# Patient Record
Sex: MALE | Race: WHITE
[De-identification: names, ages, dates, MRNs, and addresses within clinical notes are randomized per-mention and may not be internally consistent; named-entity substitution may affect disease eponyms.]

---

## 2020-10-23 ENCOUNTER — HOSPITAL ENCOUNTER (EMERGENCY)
Dept: HOSPITAL 63 - ER | Age: 85
LOS: 1 days | Discharge: ANOTHER HOSPITAL | End: 2020-10-24
Payer: MEDICARE

## 2020-10-23 VITALS — BODY MASS INDEX: 30.07 KG/M2 | WEIGHT: 198.42 LBS | HEIGHT: 68 IN

## 2020-10-23 DIAGNOSIS — J96.01: Primary | ICD-10-CM

## 2020-10-23 DIAGNOSIS — K21.9: ICD-10-CM

## 2020-10-23 DIAGNOSIS — G93.40: ICD-10-CM

## 2020-10-23 DIAGNOSIS — R53.83: ICD-10-CM
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
## 2020-10-23 DIAGNOSIS — I10: ICD-10-CM

## 2020-10-23 DIAGNOSIS — Z98.890: ICD-10-CM

## 2020-10-23 DIAGNOSIS — U07.1: ICD-10-CM

## 2020-10-23 DIAGNOSIS — S22.42XA: ICD-10-CM

## 2020-10-23 DIAGNOSIS — R53.1: ICD-10-CM

## 2020-10-23 DIAGNOSIS — A41.9: ICD-10-CM
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
## 2020-10-23 DIAGNOSIS — R77.8: ICD-10-CM

## 2020-10-23 LAB
ALBUMIN SERPL-MCNC: 3 G/DL (ref 3.4–5)
ALP SERPL-CCNC: 72 U/L (ref 46–116)
ALT SERPL-CCNC: 44 U/L (ref 16–63)
ANION GAP SERPL CALC-SCNC: 10 MMOL/L (ref 6–14)
APTT PPP: YELLOW S
AST SERPL-CCNC: 88 U/L (ref 15–37)
BACTERIA #/AREA URNS HPF: 0 /HPF
BASOPHILS # BLD AUTO: 0 X10^3/UL (ref 0–0.2)
BASOPHILS NFR BLD: 0 % (ref 0–3)
BILIRUB DIRECT SERPL-MCNC: 0.3 MG/DL (ref 0–0.2)
BILIRUB SERPL-MCNC: 0.7 MG/DL (ref 0.2–1)
BILIRUB UR QL STRIP: (no result)
CA-I SERPL ISE-MCNC: 15 MG/DL (ref 8–26)
CALCIUM SERPL-MCNC: 8.4 MG/DL (ref 8.5–10.1)
CHLORIDE SERPL-SCNC: 99 MMOL/L (ref 98–107)
CO2 SERPL-SCNC: 25 MMOL/L (ref 21–32)
CREAT SERPL-MCNC: 1.3 MG/DL (ref 0.7–1.3)
CRP SERPL-MCNC: 137.9 MG/L (ref 0–3.3)
EOSINOPHIL NFR BLD: 0 % (ref 0–3)
EOSINOPHIL NFR BLD: 0 X10^3/UL (ref 0–0.7)
ERYTHROCYTE [DISTWIDTH] IN BLOOD BY AUTOMATED COUNT: 14.6 % (ref 11.5–14.5)
FIBRINOGEN PPP-MCNC: CLEAR MG/DL
GFR SERPLBLD BASED ON 1.73 SQ M-ARVRAT: 52 ML/MIN
GLUCOSE SERPL-MCNC: 93 MG/DL (ref 70–99)
GLUCOSE UR STRIP-MCNC: (no result) MG/DL
HCT VFR BLD CALC: 44.8 % (ref 39–53)
HGB BLD-MCNC: 14.6 G/DL (ref 13–17.5)
INFLUENZA A PATIENT: NEGATIVE
INFLUENZA B PATIENT: NEGATIVE
LDH SERPL L TO P-CCNC: 411 U/L (ref 85–227)
LIPASE: 122 U/L (ref 73–393)
LYMPHOCYTES # BLD: 0.4 X10^3/UL (ref 1–4.8)
LYMPHOCYTES NFR BLD AUTO: 8 % (ref 24–48)
MCH RBC QN AUTO: 27 PG (ref 25–35)
MCHC RBC AUTO-ENTMCNC: 33 G/DL (ref 31–37)
MCV RBC AUTO: 83 FL (ref 79–100)
MONO #: 0.6 X10^3/UL (ref 0–1.1)
MONOCYTES NFR BLD: 12 % (ref 0–9)
NEUT #: 3.8 X10^3UL (ref 1.8–7.7)
NEUTROPHILS NFR BLD AUTO: 79 % (ref 31–73)
NITRITE UR QL STRIP: (no result)
PLATELET # BLD AUTO: 183 X10^3/UL (ref 140–400)
POTASSIUM SERPL-SCNC: 4 MMOL/L (ref 3.5–5.1)
PROT SERPL-MCNC: 6.4 G/DL (ref 6.4–8.2)
RBC # BLD AUTO: 5.42 X10^6/UL (ref 4.3–5.7)
RBC #/AREA URNS HPF: 0 /HPF (ref 0–2)
SODIUM SERPL-SCNC: 134 MMOL/L (ref 136–145)
SP GR UR STRIP: 1.01
SQUAMOUS #/AREA URNS LPF: (no result) /LPF
UROBILINOGEN UR-MCNC: 0.2 MG/DL
WBC # BLD AUTO: 4.8 X10^3/UL (ref 4–11)
WBC #/AREA URNS HPF: (no result) /HPF (ref 0–4)

## 2020-10-23 PROCEDURE — 85730 THROMBOPLASTIN TIME PARTIAL: CPT

## 2020-10-23 PROCEDURE — 83690 ASSAY OF LIPASE: CPT

## 2020-10-23 PROCEDURE — 80076 HEPATIC FUNCTION PANEL: CPT

## 2020-10-23 PROCEDURE — 80048 BASIC METABOLIC PNL TOTAL CA: CPT

## 2020-10-23 PROCEDURE — 84484 ASSAY OF TROPONIN QUANT: CPT

## 2020-10-23 PROCEDURE — 83605 ASSAY OF LACTIC ACID: CPT

## 2020-10-23 PROCEDURE — 96375 TX/PRO/DX INJ NEW DRUG ADDON: CPT

## 2020-10-23 PROCEDURE — 71275 CT ANGIOGRAPHY CHEST: CPT

## 2020-10-23 PROCEDURE — 96365 THER/PROPH/DIAG IV INF INIT: CPT

## 2020-10-23 PROCEDURE — 82550 ASSAY OF CK (CPK): CPT

## 2020-10-23 PROCEDURE — 70486 CT MAXILLOFACIAL W/O DYE: CPT

## 2020-10-23 PROCEDURE — 36415 COLL VENOUS BLD VENIPUNCTURE: CPT

## 2020-10-23 PROCEDURE — 82803 BLOOD GASES ANY COMBINATION: CPT

## 2020-10-23 PROCEDURE — 96366 THER/PROPH/DIAG IV INF ADDON: CPT

## 2020-10-23 PROCEDURE — 85025 COMPLETE CBC W/AUTO DIFF WBC: CPT

## 2020-10-23 PROCEDURE — 36600 WITHDRAWAL OF ARTERIAL BLOOD: CPT

## 2020-10-23 PROCEDURE — 81001 URINALYSIS AUTO W/SCOPE: CPT

## 2020-10-23 PROCEDURE — 82728 ASSAY OF FERRITIN: CPT

## 2020-10-23 PROCEDURE — 86140 C-REACTIVE PROTEIN: CPT

## 2020-10-23 PROCEDURE — 70450 CT HEAD/BRAIN W/O DYE: CPT

## 2020-10-23 PROCEDURE — 93005 ELECTROCARDIOGRAM TRACING: CPT

## 2020-10-23 PROCEDURE — 85610 PROTHROMBIN TIME: CPT

## 2020-10-23 PROCEDURE — 87804 INFLUENZA ASSAY W/OPTIC: CPT

## 2020-10-23 PROCEDURE — 94660 CPAP INITIATION&MGMT: CPT

## 2020-10-23 PROCEDURE — 74177 CT ABD & PELVIS W/CONTRAST: CPT

## 2020-10-23 PROCEDURE — 72125 CT NECK SPINE W/O DYE: CPT

## 2020-10-23 PROCEDURE — 99291 CRITICAL CARE FIRST HOUR: CPT

## 2020-10-23 PROCEDURE — 83615 LACTATE (LD) (LDH) ENZYME: CPT

## 2020-10-23 PROCEDURE — 87040 BLOOD CULTURE FOR BACTERIA: CPT

## 2020-10-23 PROCEDURE — 83880 ASSAY OF NATRIURETIC PEPTIDE: CPT

## 2020-10-23 PROCEDURE — 96367 TX/PROPH/DG ADDL SEQ IV INF: CPT

## 2020-10-23 NOTE — PHYS DOC
General Adult


EDM:


Chief Complaint:  SHORTNESS OF BREATH





HPI:


HPI:


89-year-old male past medical history significant for CVA (no residual 

deficits), hypertension, hyperlipidemia, atrial flutter on Xarelto and GERD, 

presents to the ED brought in by his daughter with concern for generalized 

weakness, fatigue, left anterior rib pain, with fall and blunt head injury 2 

days ago (LOC unknown).  Patient tested positive for Covid approximately 8 days 

ago and has completed 5 days of prednisone, 40 mg daily.  No prior h/o chf (has 

normal "EF"-daughter present is an ED physician from [place]), syncope or blood 

clots. Hx limited due to pts' confusion which is new, has no underlying dementia

(lives at home w/wife). PMD-[facility]. No recent hospitalizations. Tylenol given pta. 

Daughter reports pt voided pta.





Review of Systems:


Review of Systems:


ROS limited due to confusion-pt denies any active pain





Current Medications:


Current Meds:





Current Medications








 Medications


  (Trade)  Dose


 Ordered  Sig/Vinh  Start Time


 Stop Time Status Last Admin


Dose Admin


 


 Info


  (Do NOT chart on


 this entry -- for


 MONITORING)  1 each  PRN DAILY  PRN  10/23/20 20:30


 10/25/20 20:29   





 


 Iohexol


  (Omnipaque 350


 Mg/ml)  100 ml  1X  ONCE  10/23/20 20:15


 10/23/20 20:16 DC  





 


 Piperacillin Sod/


 Tazobactam Sod


  (Zosyn)  4.5 gm  STK-MED ONCE  10/23/20 20:07


 10/23/20 20:07 DC  





 


 Piperacillin Sod/


 Tazobactam Sod


 4.5 gm/Sodium


 Chloride  50 ml @ 


 100 mls/hr  1X  ONCE  10/23/20 20:30


 10/23/20 20:59   





 


 Sodium Chloride  1,000 ml @ 


 1,000 mls/hr  1X  ONCE  10/23/20 20:00


 10/23/20 20:59   





 


 Sodium Chloride


  (Normal Saline


 Flush)  10 ml  QSHIFT  PRN  10/23/20 20:00


     





 


 Vancomycin HCl


  (Vanco Per


 Pharmacy)  1 each  1X  ONCE  10/23/20 20:00


 10/23/20 20:01 UNV  














Allergies:


Allergies:





Allergies








Coded Allergies Type Severity Reaction Last Updated Verified


 


  No Known Drug Allergies    10/23/20 No











Physical Exam:


PE:





Constitutional: appears fatigued/worn out, temp 100.0, 


HENT: bruising over right forehead, no raccoon eyes or periorbital ecchymosis, 

bilateral external ears normal, no hemotympanum, no septal hematoma, oropharynx 

moist, no oral exudates, nose normal. []


Eyes: PERRLA, EOMI, conjunctiva normal, no discharge. [] 


Neck: Normal range of motion, no tenderness, supple, no stridor. [] 


Cardiovascular:Heart rate regular rhythm, no murmur []


Lungs & Thorax:  Bilateral breath sounds clear to auscultation [] actively dry 

coughing in ED, tachypnea in the low 30s, saturating 92% on 3 L nasal cannula 

(was in upper 70s/low 80s on no oxygen on arrival), left lower chest wall ttp, 


Abdomen: Bowel sounds normal, soft, no masses, no pulsatile masses, large area 

of ecchymosis approximately 15 x 15 cm over left lower quadrant and left lumbar 

mid axillary region, no hip ttp


Skin: Warm, dry, no erythema, no rash. [] 


Back: No midline tenderness, no CVA tenderness. [] old scabs over left mid-

thoracic region in linear unilateral distribution-resolving shingles?


Extremities: No tenderness, no cyanosis, no clubbing, ROM intact, no edema. [] 

straight leg raise negative for back/neck pain


Neurologic: Alert and oriented to self/month/year but answers some questions 

inappropriately/mumbles, normal motor function, normal sensory function, no 

focal deficits noted. []





EKG:


EKG:


Sinus rhythm at 83 bpm, no axis deviation, normal intervals, no T wave 

inversions, no ST elevations or ST depressions





Radiology/Procedures:


Radiology/Procedures:


                                 IMAGING REPORT





                                     Signed





PATIENT: TRAVON RAMOS PACCOUNT: [id number]     MRN#: [id number]


: 1931           LOCATION: ER              AGE: 89


SEX: M                    EXAM DT: 10/23/20         ACCESSION#: [id number]


STATUS: REG ER            ORD. PHYSICIAN: DANIELLA TAVAREZ DO


REASON: fall on plavix


PROCEDURE: CT HEAD AND CERVICAL SPINE WO





CT scan of the head without contrast 10/23/2020


 


 


Clinical History: Fall. Anticoagulant therapy.


 


Technique: Unenhanced, contiguous, 5 mm axial sections were obtained 


through the head.


 


One or more of the following individualized dose reduction techniques were


utilized for this study:


 


1. Automated exposure control.


2. Adjustment of the mA and/or kV according to patient size.


3. Use of iterative reconstruction technique.


 


 


Findings: There is generalized parenchymal atrophy. Areas of decreased 


attenuation are seen within the periventricular and subcortical white 


matter of both cerebral hemispheres consistent with areas of small vessel 


ischemic disease. Areas of encephalomalacia are seen involving both 


frontal lobes. No acute parenchymal abnormality is seen. No extra-axial 


fluid collection is noted. No skull fracture is seen. 


 


Impression:  No acute intracranial abnormality is seen.


 


 


 


 


CT scan of the cervical spine without contrast 2020


 


Clinical history: Fall. Neck injury.


 


Technique: Unenhanced, contiguous, 0.625 mm axial sections were obtained 


through the cervical spine. Axial, coronal and sagittal reconstructed 


images were obtained.


 


One or more of the following individualized dose reduction techniques were


utilized for this study:


 


1. Automated exposure control.


2. Adjustment of the mA and/or kV according to patient size.


3. Use of iterative reconstruction technique.


 


 


Findings: Sagittal and coronal reconstructed images demonstrate very mild 


lateral curvature of the cervical spine, convex to the right. There is 


straightening of the normal cervical lordosis. Degenerative changes 


consisting of varying degrees of disc space narrowing, vertebral endplate 


sclerosis and mild to moderate anterior and posterior vertebral body 


osteophyte formation are seen throughout the cervical disc spaces.


 


No fracture or subluxation of the cervical vertebrae is seen. Degenerative


changes are seen involving the uncovertebral and facet joints throughout 


the cervical disc spaces.


 


 


 


Impression: No fracture or subluxation of the cervical vertebra is 


identified.


 


Electronically signed by: Jb Knowles MD (10/23/2020 9:50 PM) [id number]














DICTATED AND SIGNED BY:     JB KNOWLES MD


DATE:     10/23/20 2150





CC: KEVIN BEGUM MD; DANIELLA TAVAREZ DO ~


                                 IMAGING REPORT





                                     Signed





PATIENT: TRAVON RAMOS PACCOUNT: [id number]     MRN#: [id number]


: 1931           LOCATION: ER              AGE: 89


SEX: M                    EXAM DT: 10/23/20         ACCESSION#: [id number]


STATUS: REG ER            ORD. PHYSICIAN: DANIELLA TAVAREZ DO


REASON: fall on plavix


PROCEDURE: CT MAXILLOFACIAL WO CONTRAST





CT scan of the facial bones without contrast 10/23/2020


 


CLINICAL HISTORY: Fall with facial injury.


 


TECHNIQUE: Unenhanced, contiguous, 0.625 mm axial sections were obtained 


through the facial bones and orbits. 3 mm reconstructed sagittal, axial 


and coronal images were obtained. 


 


One or more of the following individualized dose reduction techniques were


utilized for this study:


 


1. Automated exposure control.


2. Adjustment of the mA and/or kV according to patient size.


3. Use of iterative reconstruction technique.


 


FINDINGS: No facial bone fracture is seen. Both orbits are intact. Mild 


mucosal thickening is involving the left maxillary sinus and scattered 


throughout the ethmoid air cells bilaterally. No air-fluid level is seen.


 


IMPRESSION: No facial bone or orbital fracture is seen.


 


Electronically signed by: Jb Knowles MD (10/23/2020 10:12 PM) [id number]














DICTATED AND SIGNED BY:     JB KNOWLES MD


DATE:     10/23/20 2212





CC: KEVIN BEGUM MD; DANIELLA TAVAREZ DO ~


                                 IMAGING REPORT





                                     Signed





PATIENT: TRAVON RAMOS PACCOUNT: [id number]     MRN#: [id number]


: 1931           LOCATION: ER              AGE: 89


SEX: M                    EXAM DT: 10/23/20         ACCESSION#: [id number]


STATUS: REG ER            ORD. PHYSICIAN: DANIELLA TAVAREZ DO


REASON: OMNI 350,90ML IV.SOA,+COVID X8 DAYS AGO,R/O PE.


PROCEDURE: CT ANGIO CHEST W ABD PEL W/





Exam: CT of chest, abdomen and pelvis with contrast


 


INDICATION: Short of air


 


TECHNIQUE: Sequential axial images through the chest, abdomen and pelvis 


obtained following the administration of 90 mL of Omni 350 IV contrast. 


Sagittal and coronal reformatted images were reconstructed from the axial 


data and reviewed. 3-D reformatted images were reconstructed from the 


axial data and reviewed.


 


Comparisons: None


 


FINDINGS:


Visualized portions thyroid are unremarkable. No enlarged mediastinal 


lymph nodes are identified.


 


Heart size is normal. No pericardial effusion. Mild coronary artery 


calcification. Thoracic aorta has a normal course and caliber. Pulmonary 


artery is not enlarged. No pulmonary embolus identified within the main, 


lobar or proximal segmental pulmonary arteries.


 


Airways are patent. There is patchy groundglass opacity in periphery lungs


bilaterally. No pneumothorax.


 


Trace left pleural effusion.


 


Evaluation of the upper abdomen is markedly limited secondary to extensive


respiratory motion.


 


There is a mildly displaced fractures involving the posterior lateral 


eighth and ninth ribs.


 


Liver, spleen, pancreas, adrenals and gallbladder are unremarkable.


 


No perinephric inflammation or hydronephrosis. Hypoattenuating cystic 


lesion exophytic likely arising off the mid to lower pole of the right 


kidney likely representing simple cysts. No renal or ureteral calculi are 


identified.


 


Bladder is partially distended and not well evaluated. Prostate is not 


well evaluated.


 


Large and small bowel are unremarkable. Appendix is normal. No free 


intra-abdominal air or fluid. No obstruction.


 


Abdominal aorta has a normal course and caliber. Abdominal vasculature is 


patent.


 


No enlarged intra-abdominal lymph nodes are identified.


 


IMPRESSION:


1.  Extensive Patchy groundglass opacity at the lungs bilaterally 


consistent with history of Covid.


2.  No pulmonary embolus identified within the main, lobar or segmental 


pulmonary arteries.


3.  Mildly displaced fractures involving the left lateral eighth and ninth


ribs.


 


 


Exposure: One or more of the following in the visualized dose reduction 


techniques were utilized for this examination:


1.  Automated exposure control


2.  Adjustment of the MA and/or KV according to patient size


3.  Use of iterative of reconstructive technique


 


Electronically signed by: Aidan Hartmann MD (10/23/2020 9:50 PM) [facility]














DICTATED AND SIGNED BY:     AIDAN HARTMANN MD


DATE:     10/23/20 2150





CC: KEVIN BEGUM MD; DANIELLA TAVAREZ DO ~





Heart Score:


Risk Factors:


Risk Factors:  DM, Current or recent (<one month) smoker, HTN, HLP, family 

history of CAD, obesity.


Risk Scores:


Score 0 - 3:  2.5% MACE over next 6 weeks - Discharge Home


Score 4 - 6:  20.3% MACE over next 6 weeks - Admit for Clinical Observation


Score 7 - 10:  72.7% MACE over next 6 weeks - Early Invasive Strategies





Course & Med Decision Making:


Course & Med Decision Making


Pertinent Labs and Imaging studies reviewed. (See chart for details)





COVID-19 CRITERIA:    The patient was evaluated during the global COVID-19 

pandemic, and that diagnosis was suspected/considered upon their initial 

presentation.  Their evaluation, treatment and testing was consistent with c

urrent guidelines for patients who present with complaints or symptoms that may 

be related to COVID-19.





Concern for acute hypoxic respiratory failure, requiring BiPAP.  Meets sepsis 

criteria-has covid, influenza negative.  No lactic acidosis or leukocytosis.  

Troponin elevated-demand vs ischemia vs myocarditis, pt denies chest pain or 

soa. Also with acute encephalopathy-is confused. Pt improving on bipap, 

tachypnea resolved, saturating 96-97% on FiO2 55%, /. CTA with no PE, left 

lateral mildly displaced rib fractures (fell 2 days ago). U/A obtained from 

straight cath with trace blood, no infection (has bph and is voiding small 

amounts in ed, daughter refuses riggs placement). Due to critical nature, will 

transfer to higher level of care with interventional cardiology. Pts' daughter 

at bedside, requesting transfer to  although  and both [facility] are on 

high volume. Daughter requesting [facility]-pt accepted and stable at time of 

transfer.





Critical Care: Authorized and Performed by: Daniella Tavarez DO


Total critical care time: approximately 45 minutes





Due to a high probability of clinically significant, life threatening 

deterioration, the patient required my highest level of preparedness to 

intervene emergently and I personally spent this critical care time directly and

 personally managing the patient. This critical care time included obtaining a 

history; examining the patient; pulse oximetry; ventilator management if 

necessary; ordering and review of studies; arranging urgent treatment with d

evelopment of a management plan; evaluation of patient's response to treatment; 

frequent reassessment; discussion with patient/family; and, discussions with 

other providers. This critical care time was performed to assess and manage the 

high probability of imminent, life-threatening deterioration that could result 

in multi-organ failure. It was exclusive of separately billable procedures and 

treating other patients and teaching time.


Please see MDM section and the rest of the note for further information on 

patient assessment and treatment.





Dragon Disclaimer:


Dragon Disclaimer:


This electronic medical record was generated, in whole or in part, using a voice

 recognition dictation system.





Departure


Departure:


Impression:  


   Primary Impression:  


   Acute respiratory failure with hypoxia


   Additional Impressions:  


   COVID-19


   Elevated troponin I level


   Sepsis


   Encephalopathy acute


   Fracture, ribs


Disposition:  02 DC/TRF OTHER SHORT TERM HOS (Accepted at [facility], Dr. Aki Jamil)


Condition:  CRITICAL


Referrals:  


KEVIN BEGUM MD (PCP)











DANIELLA TAVAREZ DO               Oct 23, 2020 20:36

## 2020-10-23 NOTE — RAD
CT scan of the facial bones without contrast 10/23/2020

 

CLINICAL HISTORY: Fall with facial injury.

 

TECHNIQUE: Unenhanced, contiguous, 0.625 mm axial sections were obtained 

through the facial bones and orbits. 3 mm reconstructed sagittal, axial 

and coronal images were obtained. 

 

One or more of the following individualized dose reduction techniques were

utilized for this study:

 

1. Automated exposure control.

2. Adjustment of the mA and/or kV according to patient size.

3. Use of iterative reconstruction technique.

 

FINDINGS: No facial bone fracture is seen. Both orbits are intact. Mild 

mucosal thickening is involving the left maxillary sinus and scattered 

throughout the ethmoid air cells bilaterally. No air-fluid level is seen.

 

IMPRESSION: No facial bone or orbital fracture is seen.

 

Electronically signed by: Jb Knowles MD (10/23/2020 10:12 PM) [id number]

## 2020-10-23 NOTE — RAD
Exam: CT of chest, abdomen and pelvis with contrast

 

INDICATION: Short of air

 

TECHNIQUE: Sequential axial images through the chest, abdomen and pelvis 

obtained following the administration of 90 mL of Omni 350 IV contrast. 

Sagittal and coronal reformatted images were reconstructed from the axial 

data and reviewed. 3-D reformatted images were reconstructed from the 

axial data and reviewed.

 

Comparisons: None

 

FINDINGS:

Visualized portions thyroid are unremarkable. No enlarged mediastinal 

lymph nodes are identified.

 

Heart size is normal. No pericardial effusion. Mild coronary artery 

calcification. Thoracic aorta has a normal course and caliber. Pulmonary 

artery is not enlarged. No pulmonary embolus identified within the main, 

lobar or proximal segmental pulmonary arteries.

 

Airways are patent. There is patchy groundglass opacity in periphery lungs

bilaterally. No pneumothorax.

 

Trace left pleural effusion.

 

Evaluation of the upper abdomen is markedly limited secondary to extensive

respiratory motion.

 

There is a mildly displaced fractures involving the posterior lateral 

eighth and ninth ribs.

 

Liver, spleen, pancreas, adrenals and gallbladder are unremarkable.

 

No perinephric inflammation or hydronephrosis. Hypoattenuating cystic 

lesion exophytic likely arising off the mid to lower pole of the right 

kidney likely representing simple cysts. No renal or ureteral calculi are 

identified.

 

Bladder is partially distended and not well evaluated. Prostate is not 

well evaluated.

 

Large and small bowel are unremarkable. Appendix is normal. No free 

intra-abdominal air or fluid. No obstruction.

 

Abdominal aorta has a normal course and caliber. Abdominal vasculature is 

patent.

 

No enlarged intra-abdominal lymph nodes are identified.

 

IMPRESSION:

1.  Extensive Patchy groundglass opacity at the lungs bilaterally 

consistent with history of Covid.

2.  No pulmonary embolus identified within the main, lobar or segmental 

pulmonary arteries.

3.  Mildly displaced fractures involving the left lateral eighth and ninth

ribs.

 

 

Exposure: One or more of the following in the visualized dose reduction 

techniques were utilized for this examination:

1.  Automated exposure control

2.  Adjustment of the MA and/or KV according to patient size

3.  Use of iterative of reconstructive technique

 

Electronically signed by: Aidan Arita MD (10/23/2020 9:50 PM) [facility]

## 2020-10-23 NOTE — RAD
CT scan of the head without contrast 10/23/2020

 

 

Clinical History: Fall. Anticoagulant therapy.

 

Technique: Unenhanced, contiguous, 5 mm axial sections were obtained 

through the head.

 

One or more of the following individualized dose reduction techniques were

utilized for this study:

 

1. Automated exposure control.

2. Adjustment of the mA and/or kV according to patient size.

3. Use of iterative reconstruction technique.

 

 

Findings: There is generalized parenchymal atrophy. Areas of decreased 

attenuation are seen within the periventricular and subcortical white 

matter of both cerebral hemispheres consistent with areas of small vessel 

ischemic disease. Areas of encephalomalacia are seen involving both 

frontal lobes. No acute parenchymal abnormality is seen. No extra-axial 

fluid collection is noted. No skull fracture is seen. 

 

Impression:  No acute intracranial abnormality is seen.

 

 

 

 

CT scan of the cervical spine without contrast June 23, 2020

 

Clinical history: Fall. Neck injury.

 

Technique: Unenhanced, contiguous, 0.625 mm axial sections were obtained 

through the cervical spine. Axial, coronal and sagittal reconstructed 

images were obtained.

 

One or more of the following individualized dose reduction techniques were

utilized for this study:

 

1. Automated exposure control.

2. Adjustment of the mA and/or kV according to patient size.

3. Use of iterative reconstruction technique.

 

 

Findings: Sagittal and coronal reconstructed images demonstrate very mild 

lateral curvature of the cervical spine, convex to the right. There is 

straightening of the normal cervical lordosis. Degenerative changes 

consisting of varying degrees of disc space narrowing, vertebral endplate 

sclerosis and mild to moderate anterior and posterior vertebral body 

osteophyte formation are seen throughout the cervical disc spaces.

 

No fracture or subluxation of the cervical vertebrae is seen. Degenerative

changes are seen involving the uncovertebral and facet joints throughout 

the cervical disc spaces.

 

 

 

Impression: No fracture or subluxation of the cervical vertebra is 

identified.

 

Electronically signed by: Jb Knowles MD (10/23/2020 9:50 PM) [id number]

## 2020-10-24 VITALS — DIASTOLIC BLOOD PRESSURE: 86 MMHG | SYSTOLIC BLOOD PRESSURE: 147 MMHG

## 2020-10-24 LAB
BGAS PCO2: 31 MMHG (ref 35–46)
BGAS PCO2: 35 MMHG (ref 35–46)
BGAS PH: 7.42 (ref 7.35–7.46)
BGAS PH: 7.46 (ref 7.35–7.46)
BGAS PO2: 65 MMHG (ref 71–100)
BGAS PO2: 65 MMHG (ref 71–100)
DELTA BASE BGAS: -1 MMOL/L (ref 0–3)
DELTA BASE BGAS: -2 MMOL/L (ref 0–3)
O2 SAT BGAS: 92 % (ref 92–99)
O2 SAT BGAS: 93 % (ref 92–99)
O2/TOTAL GAS SETTING VFR VENT: 32 %
O2/TOTAL GAS SETTING VFR VENT: 35 %

## 2020-10-24 NOTE — EKG
[facility] [address], [place] [address]

Test Date:    2020-10-23               Test Time:    20:34:34

Pat Name:     TRAVON RAMOS          Department:   

Patient ID:   [id number]           Room:          

Gender:       M                        Technician:   REEMA

:          [id number]               Requested By: TONIE TAVAREZ

Order Number: [id number]            Reading MD:   Jerome Davis

                                 Measurements

Intervals                              Axis          

Rate:         83                       P:            249

CO:           178                      QRS:          11

QRSD:         78                       T:            14

QT:           362                                    

QTc:          431                                    

                           Interpretive Statements

SINUS RHYTHM

NORMAL ECG

RI6.02

No previous ECG available for comparison

Electronically Signed On 11-3-2020 12:37:57 CST by Jerome Davis